# Patient Record
Sex: FEMALE | Race: BLACK OR AFRICAN AMERICAN | NOT HISPANIC OR LATINO | URBAN - METROPOLITAN AREA
[De-identification: names, ages, dates, MRNs, and addresses within clinical notes are randomized per-mention and may not be internally consistent; named-entity substitution may affect disease eponyms.]

---

## 2024-04-16 ENCOUNTER — EMERGENCY (EMERGENCY)
Facility: HOSPITAL | Age: 34
LOS: 1 days | Discharge: HOME | End: 2024-04-16
Attending: EMERGENCY MEDICINE
Payer: MEDICAID

## 2024-04-16 VITALS
WEIGHT: 288.81 LBS | OXYGEN SATURATION: 99 % | HEART RATE: 77 BPM | HEIGHT: 69 IN | DIASTOLIC BLOOD PRESSURE: 80 MMHG | TEMPERATURE: 98 F | SYSTOLIC BLOOD PRESSURE: 130 MMHG | RESPIRATION RATE: 16 BRPM

## 2024-04-16 PROCEDURE — 99283 EMERGENCY DEPT VISIT LOW MDM: CPT | Mod: 25

## 2024-04-16 PROCEDURE — 30300 REMOVE NASAL FOREIGN BODY: CPT | Mod: LT

## 2024-04-16 PROCEDURE — 70160 X-RAY EXAM OF NASAL BONES: CPT

## 2024-04-16 PROCEDURE — 70160 X-RAY EXAM OF NASAL BONES: CPT | Mod: 26

## 2024-04-16 PROCEDURE — 99284 EMERGENCY DEPT VISIT MOD MDM: CPT | Mod: 25

## 2024-04-16 NOTE — ED PROVIDER NOTE - IV ALTEPLASE DOOR HIDDEN
show Bathing allowed/Stairs allowed/Walking - Indoors allowed/No heavy lifting/straining/Walking - Outdoors allowed

## 2024-04-16 NOTE — ED PROVIDER NOTE - PATIENT PORTAL LINK FT
You can access the FollowMyHealth Patient Portal offered by [facility] by registering at the following website: [email or web address]. By joining [employer]’s FollowMyHealth portal, you will also be able to view your health information using other applications (apps) compatible with our system.

## 2024-04-16 NOTE — ED ADULT TRIAGE NOTE - BIRTH SEX
TRANSFER - OUT REPORT:    Verbal report given to Ruth Mcbride RN on Chantell Reyes  being transferred to [phone] [id number] for routine post - op       Report consisted of patients Situation, Background, Assessment and   Recommendations(SBAR). Information from the following report(s) OR Summary, Procedure Summary, Intake/Output and MAR was reviewed with the receiving nurse. Opportunity for questions and clarification was provided.       Patient transported on room air Female

## 2024-04-16 NOTE — ED PROVIDER NOTE - OBJECTIVE STATEMENT
33-year-old female denies past medical history coming in for removal of a nose ring in her left nose.  Patient states was placed about 10 years ago.  Recently noticed some swelling in the area.  Patient states went to urgent care already and they attempted removal but were not able to remove the ring.  States she tried to get an ENT appointment but they did not have any appointments until June.  Patient states he is concerned that it could be causing an infection in her nose.  Denies other complaints.

## 2024-04-16 NOTE — ED PROVIDER NOTE - PHYSICAL EXAMINATION
Nose ring in left nostril with underlying swelling with minimal tenderness to palpation.  No purulence.

## 2024-04-16 NOTE — ED PROVIDER NOTE - PROGRESS NOTE DETAILS
Nose ring removed.  X-ray with no retained foreign body.  Will discharge.  Follow-up with primary care doctor.  Return precautions discussed.

## 2024-04-16 NOTE — ED PROVIDER NOTE - CLINICAL SUMMARY MEDICAL DECISION MAKING FREE TEXT BOX
33-year-old female presents for removal of left nose ring.  PE as above.  Nose ring was removed.  X-ray to rule out any retained foreign body.

## 2024-04-25 NOTE — ED PROVIDER NOTE - PROVIDER WHO IDEPENDENTLY INTERPRETED
Physical Therapy Treatment    Patient Name: Destiny Yañez  MRN: [id number]  Today's Date: 2024  Time Calculation  Start Time: 1110  Stop Time: 1155  Time Calculation (min): 45 min    Visit Number:  4 / 10   Visit Authorized:  10    Current Problem  1. Left ankle tendinitis        2. Chronic pain of left ankle  Follow Up In Physical Therapy          Precautions  Precautions  Precautions Comment: none    Pain  Pain Score: 2    Subjective/General     Was a little sore after last yenifer't but not bad. Feels there may be some improvement.    Objective  Gait normal      Treatment:  Therapeutic Exercise  Therapeutic Exercise Activity 1: stretching/strengthening    Balance/Neuromuscular Re-Education  Balance/Neuromuscular Re-Education Activity 1: proprio training.         Assessment:   Able to progress PRE's today and through level 5 seated on BAPs.    End of session pain ratin/10    Plan:     Continue with current POC with the following changes , progress standing proprio work.    Assessment of current progress against goals:  Progressing toward functional goals  Goals:   Indep HEP   dr guadarrama

## 2024-10-04 ENCOUNTER — TRANSCRIPTION ENCOUNTER (OUTPATIENT)
Age: 34
End: 2024-10-04

## 2025-02-14 ENCOUNTER — EMERGENCY (EMERGENCY)
Facility: HOSPITAL | Age: 35
LOS: 0 days | Discharge: HOME | End: 2025-02-14
Attending: STUDENT IN AN ORGANIZED HEALTH CARE EDUCATION/TRAINING PROGRAM
Payer: MEDICAID

## 2025-02-14 VITALS
HEART RATE: 79 BPM | WEIGHT: 251.99 LBS | OXYGEN SATURATION: 99 % | TEMPERATURE: 98 F | DIASTOLIC BLOOD PRESSURE: 77 MMHG | RESPIRATION RATE: 20 BRPM | HEIGHT: 69 IN | SYSTOLIC BLOOD PRESSURE: 128 MMHG

## 2025-02-14 DIAGNOSIS — Z90.3 ACQUIRED ABSENCE OF STOMACH [PART OF]: Chronic | ICD-10-CM

## 2025-02-14 DIAGNOSIS — M54.2 CERVICALGIA: ICD-10-CM

## 2025-02-14 PROCEDURE — 72125 CT NECK SPINE W/O DYE: CPT | Mod: 26

## 2025-02-14 PROCEDURE — 99284 EMERGENCY DEPT VISIT MOD MDM: CPT

## 2025-02-14 RX ORDER — LIDOCAINE HYDROCHLORIDE 30 MG/G
1 CREAM TOPICAL ONCE
Refills: 0 | Status: COMPLETED | OUTPATIENT
Start: 2025-02-14 | End: 2025-02-14

## 2025-02-14 RX ORDER — ACETAMINOPHEN 160 MG/5ML
975 SUSPENSION ORAL ONCE
Refills: 0 | Status: COMPLETED | OUTPATIENT
Start: 2025-02-14 | End: 2025-02-14

## 2025-02-14 RX ORDER — METHOCARBAMOL 500 MG
1500 TABLET ORAL ONCE
Refills: 0 | Status: COMPLETED | OUTPATIENT
Start: 2025-02-14 | End: 2025-02-14

## 2025-02-14 RX ORDER — LIDOCAINE HYDROCHLORIDE 30 MG/G
1 CREAM TOPICAL
Qty: 2 | Refills: 0
Start: 2025-02-14 | End: 2025-02-23

## 2025-02-14 RX ADMIN — ACETAMINOPHEN 975 MILLIGRAM(S): 160 SUSPENSION ORAL at 16:07

## 2025-02-14 RX ADMIN — LIDOCAINE HYDROCHLORIDE 1 PATCH: 30 CREAM TOPICAL at 16:07

## 2025-02-14 RX ADMIN — Medication 1500 MILLIGRAM(S): at 16:07

## 2025-02-14 NOTE — ED ADULT NURSE NOTE - NS_NURSE_DISC_TEACHING_YN_ED_ALL_ED
You are seen in the emergency department for bleeding from your fistula.  It was controlled in the emergency department.  Your laboratory studies were grossly within normal limits.  Your stability discharged home.  Please continue taking your medications as prescribed, please continue your follow-up appointments as you have been previously scheduled.  If you have any new or worsening symptoms including but not limited to repeat bleeding please return to the emergency department immediately for further evaluation.  Thank you  
Yes

## 2025-02-14 NOTE — ED PROVIDER NOTE - PATIENT PORTAL LINK FT
You can access the FollowMyHealth Patient Portal offered by [facility] by registering at the following website: [email or web address]. By joining [employer]’s FollowMyHealth portal, you will also be able to view your health information using other applications (apps) compatible with our system.

## 2025-02-14 NOTE — ED PROVIDER NOTE - PHYSICAL EXAMINATION
Gen: aox3, NAD   Head: NCAT  ENT: Airway patent, moist mucous membranes, nasal passageways clear, no cervical lymphadenopathy  Cardiac: Normal rate, normal rhythm   Respiratory: Lungs CTA B/L  Gastrointestinal: Abdomen soft, nontender, nondistended, no rebound, no guarding  MSK: No gross abnormalities, FROM of all four extremities, no edema, + left paraspinal cervical ttp   HEME: Extremities warm, radial pulses intact and symmetrical    Skin: No rashes, no lesions  Neuro: No gross neurologic deficits, no dysarthria, , no sensory deficits, strength equal in all four extremities, no gait abnormality

## 2025-02-14 NOTE — ED PROVIDER NOTE - DISCHARGE REVIEW MATERIAL PRESENTED
Hi, this is Darren Medrano. Scarlet Tse just returning a call for [facility]. . Sorry, I was unable to get to call before, we've had meetings all day and never go the way you think it's going to go . So she can give me a call [phone]. Thank you very much. Just returning her call. .

## 2025-02-14 NOTE — ED ADULT NURSE NOTE - OBJECTIVE STATEMENT
Patient alert and verbally responsive came in due to neck pain since Sunday after going to the gym,  using squatting machine with weights ,pain increasing since yesterday  H/O Gastric Sleeve 10/24  LMP 1/22

## 2025-02-14 NOTE — ED ADULT TRIAGE NOTE - CHIEF COMPLAINT QUOTE
Neck pain since Sunday after going to the gym,  using squatting machine with weights ,pain increasing since yesterday  H/O Gastric Sleeve 10/24  LMP 1/22

## 2025-02-14 NOTE — ED PROVIDER NOTE - NSFOLLOWUPINSTRUCTIONS_ED_ALL_ED_FT
for pain relief -  Take tylenol 650 mg every 6 hours as needed for pain   Can take methocarbamol/robaxin 1000mg every 8 hours as needed for muscle relaxant (can cause dizziness, do not drive or operate machinery while taking this medication)   Can use local lidocaine patch once a day for 12 hours on and 12 hours off for relief of pain       Follow up with your primary doctor in 1-2 days  If symptoms persist -   Follow up with your orthopedist or spine clinic for physical therapy and further evaluation within 1 week: [id number]    Back/Neck pain   Back pain is very common in adults. The cause of back pain is rarely dangerous and the pain often gets better over time. The cause of your back pain may not be known and may include strain of muscles or ligaments, degeneration of the spinal disks, or arthritis. Occasionally the pain may radiate down your leg(s). Over-the-counter medicines to reduce pain and inflammation are often the most helpful. Stretching and remaining active frequently helps the healing process.     SEEK IMMEDIATE MEDICAL CARE IF YOU HAVE ANY OF THE FOLLOWING SYMPTOMS: bowel or bladder control problems, unusual weakness or numbness in your arms or legs, nausea or vomiting, abdominal pain, fever, dizziness/lightheadedness.

## 2025-02-14 NOTE — ED PROVIDER NOTE - OBJECTIVE STATEMENT
34F pmhx gastric sleeve oct 2024, on wellbutrin, c/o left lateral neck pain worse with turning head towards right, worse with prolonged sitting and movement, onset gradually one day after squatting exercise 6 days ago, states took oxycodone at 1230am without relief, denies numbness/weakness/ chest pain. no ocp use. No direct injury. No vision changes/ headache , dizziness 34F pmhx gastric sleeve oct 2024, on wellbutrin, c/o left lateral neck pain worse with turning head towards right, worse with prolonged sitting and movement, onset gradually one day after squatting exercise 6 days ago, states took oxycodone at 1230am without relief, denies numbness/weakness/ chest pain. no ocp use. No direct injury. No vision changes/ headache  or dizziness

## 2025-02-14 NOTE — ED PROVIDER NOTE - CARE PROVIDER_API CALL
Gina Tillman  Orthopaedic Surgery  [address] [facility], [address] [address]  Phone: [phone]  Fax: [phone]  Follow Up Time:

## 2025-02-14 NOTE — ED PROVIDER NOTE - CLINICAL SUMMARY MEDICAL DECISION MAKING FREE TEXT BOX
34F w/ hx as above who presents for eval of severe left lateral neck pain worse with movement, onset after squatting exercise using machine - suspected nerve impingement or muscle strain, trial muscle relaxers, nsaids, ct c spine to rule out large disc herniation given type of exercise performed prior to injury onset, do not suspect vasc injury as no vision changes, dizziness or headache, no red flags for cord compression - neuro vasc intact, pt denies pregnancy and declined pregnany test   - pt improved after medications, informed of stenosis on ct c spine and outpt follow up, return precautions discussed , stable for dc 34F w/ hx as above who presents for eval of severe left lateral neck pain worse with movement, onset after squatting exercise using machine - suspected nerve impingement or muscle strain, trial muscle relaxers,  ct c spine to rule out large disc herniation given type of exercise performed prior to injury onset, do not suspect vasc injury as no vision changes, dizziness or headache, no red flags for cord compression - neuro vasc intact, pt denies pregnancy and declined pregnany test   - pt improved after medications, informed of stenosis on ct c spine and outpt follow up, return precautions discussed , stable for dc

## 2025-02-14 NOTE — ED ADULT NURSE NOTE - NS ED NURSE LEVEL OF CONSCIOUSNESS SPEECH
chronic microvascular disease.   Left maxillary sinusitis.     Liver US 10/26/24  IMPRESSION:  1. Hepatic cirrhosis.  2. Small to moderate ascites.     CTA head neck 10/26/24  IMPRESSION:  1. No acute intracranial abnormality.  2. Global parenchymal volume loss with chronic microvascular ischemic changes.  3.  The ventricular dilatation is out of proportion to the cortical sulci. While this may be due to predominantly central parenchymal volume loss, a component of normal pressure hydrocephalus cannot be excluded.  4. No flow limiting stenosis of the cervical carotid/vertebral arteries.  5. There is suggestion of occlusion involving the V4 segment of the intracranial right vertebral artery due to atherosclerosis.  6. Atherosclerosis contributes to mild-to-moderate stenosis of the right supraclinoid ICA.  7. Otherwise, no significant stenosis or large vessel occlusion of the [id number].     CT CAP 10/25/24  IMPRESSION:  1. No acute abnormality in the chest.  2. Nodular liver surface suggesting cirrhosis.  Moderate to large volume of ascites.  3. Cholelithiasis.  4. Colonic diverticulosis without evidence of acute diverticulitis.     TTE 10/28/24    Left Ventricle: Very limited study due to poor visualization. Left ventricular function is probabaly normal but can not be confirmed. No EF was obtained with this study. Unable to assess wall motion.    Right Ventricle: Not well visualized.    Image quality is technically difficult. Technically difficult study, technically difficult study with poor endocardial visualization, limited Doppler study due to patient's condition and procedure performed with the patient in a supine position.    Assessment:     Patient Active Problem List    Diagnosis Date Noted    Mild episode of recurrent major depressive disorder (HCC) 03/20/2023     Priority: Medium    JCARLOS (acute kidney injury) (HCC) 02/03/2023     Priority: Medium    Duodenitis 02/03/2023     Priority: Medium    Bipolar  community onset bacteremia and not nosocomial infection.    CT CAP and MRI brain negative for any evidence of metastatic foci.  LP has ruled out meningitis.  No evidence of SBP.   Abx were started on 10/26  TTE 10/28/24 was very limited.  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

## 2025-02-19 DIAGNOSIS — M54.2 CERVICALGIA: ICD-10-CM

## 2025-02-25 ENCOUNTER — APPOINTMENT (OUTPATIENT)
Dept: ORTHOPEDIC SURGERY | Facility: CLINIC | Age: 35
End: 2025-02-25